# Patient Record
Sex: MALE | Race: WHITE | NOT HISPANIC OR LATINO | Employment: FULL TIME | URBAN - NONMETROPOLITAN AREA
[De-identification: names, ages, dates, MRNs, and addresses within clinical notes are randomized per-mention and may not be internally consistent; named-entity substitution may affect disease eponyms.]

---

## 2019-06-06 ENCOUNTER — OFFICE VISIT (OUTPATIENT)
Dept: FAMILY MEDICINE CLINIC | Facility: CLINIC | Age: 24
End: 2019-06-06

## 2019-06-06 ENCOUNTER — LAB (OUTPATIENT)
Dept: LAB | Facility: OTHER | Age: 24
End: 2019-06-06

## 2019-06-06 VITALS
BODY MASS INDEX: 22.05 KG/M2 | OXYGEN SATURATION: 93 % | WEIGHT: 137.2 LBS | RESPIRATION RATE: 16 BRPM | HEIGHT: 66 IN | DIASTOLIC BLOOD PRESSURE: 68 MMHG | TEMPERATURE: 98.7 F | SYSTOLIC BLOOD PRESSURE: 116 MMHG | HEART RATE: 83 BPM

## 2019-06-06 DIAGNOSIS — F11.11 HISTORY OF OPIOID ABUSE (HCC): ICD-10-CM

## 2019-06-06 DIAGNOSIS — G89.29 CHRONIC MIDLINE THORACIC BACK PAIN: ICD-10-CM

## 2019-06-06 DIAGNOSIS — M54.50 CHRONIC MIDLINE LOW BACK PAIN WITHOUT SCIATICA: Primary | ICD-10-CM

## 2019-06-06 DIAGNOSIS — R73.9 HYPERGLYCEMIA: Primary | ICD-10-CM

## 2019-06-06 DIAGNOSIS — Z00.00 ENCOUNTER FOR MEDICAL EXAMINATION TO ESTABLISH CARE: ICD-10-CM

## 2019-06-06 DIAGNOSIS — M54.6 CHRONIC MIDLINE THORACIC BACK PAIN: ICD-10-CM

## 2019-06-06 DIAGNOSIS — F19.11 HISTORY OF SUBSTANCE ABUSE (HCC): ICD-10-CM

## 2019-06-06 DIAGNOSIS — G89.29 CHRONIC MIDLINE LOW BACK PAIN WITHOUT SCIATICA: Primary | ICD-10-CM

## 2019-06-06 DIAGNOSIS — R73.9 HYPERGLYCEMIA: ICD-10-CM

## 2019-06-06 PROBLEM — M41.80 DEXTROSCOLIOSIS: Status: ACTIVE | Noted: 2019-06-06

## 2019-06-06 LAB
ALBUMIN SERPL-MCNC: 4.7 G/DL (ref 3.5–5)
ALBUMIN/GLOB SERPL: 1.5 G/DL (ref 1.1–1.8)
ALP SERPL-CCNC: 106 U/L (ref 38–126)
ALT SERPL W P-5'-P-CCNC: 22 U/L
ANION GAP SERPL CALCULATED.3IONS-SCNC: 8 MMOL/L (ref 5–15)
AST SERPL-CCNC: 33 U/L (ref 17–59)
BASOPHILS # BLD AUTO: 0.04 10*3/MM3 (ref 0–0.2)
BASOPHILS NFR BLD AUTO: 0.8 % (ref 0–1.5)
BILIRUB SERPL-MCNC: 0.3 MG/DL (ref 0.2–1.3)
BUN BLD-MCNC: 13 MG/DL (ref 7–23)
BUN/CREAT SERPL: 17.6 (ref 7–25)
CALCIUM SPEC-SCNC: 10.3 MG/DL (ref 8.4–10.2)
CHLORIDE SERPL-SCNC: 103 MMOL/L (ref 101–112)
CO2 SERPL-SCNC: 29 MMOL/L (ref 22–30)
CREAT BLD-MCNC: 0.74 MG/DL (ref 0.7–1.3)
DEPRECATED RDW RBC AUTO: 43 FL (ref 37–54)
EOSINOPHIL # BLD AUTO: 0.26 10*3/MM3 (ref 0–0.4)
EOSINOPHIL NFR BLD AUTO: 5 % (ref 0.3–6.2)
ERYTHROCYTE [DISTWIDTH] IN BLOOD BY AUTOMATED COUNT: 13.1 % (ref 12.3–15.4)
GFR SERPL CREATININE-BSD FRML MDRD: 128 ML/MIN/1.73 (ref 77–179)
GLOBULIN UR ELPH-MCNC: 3.2 GM/DL (ref 2.3–3.5)
GLUCOSE BLD-MCNC: 101 MG/DL (ref 70–99)
HCT VFR BLD AUTO: 42.5 % (ref 37.5–51)
HGB BLD-MCNC: 14.8 G/DL (ref 13–17.7)
LYMPHOCYTES # BLD AUTO: 1.37 10*3/MM3 (ref 0.7–3.1)
LYMPHOCYTES NFR BLD AUTO: 26.2 % (ref 19.6–45.3)
MCH RBC QN AUTO: 31.8 PG (ref 26.6–33)
MCHC RBC AUTO-ENTMCNC: 34.8 G/DL (ref 31.5–35.7)
MCV RBC AUTO: 91.4 FL (ref 79–97)
MONOCYTES # BLD AUTO: 0.44 10*3/MM3 (ref 0.1–0.9)
MONOCYTES NFR BLD AUTO: 8.4 % (ref 5–12)
NEUTROPHILS # BLD AUTO: 3.12 10*3/MM3 (ref 1.7–7)
NEUTROPHILS NFR BLD AUTO: 59.6 % (ref 42.7–76)
PLATELET # BLD AUTO: 293 10*3/MM3 (ref 140–450)
PMV BLD AUTO: 8.9 FL (ref 6–12)
POTASSIUM BLD-SCNC: 4.5 MMOL/L (ref 3.4–5)
PROT SERPL-MCNC: 7.9 G/DL (ref 6.3–8.6)
RBC # BLD AUTO: 4.65 10*6/MM3 (ref 4.14–5.8)
SODIUM BLD-SCNC: 140 MMOL/L (ref 137–145)
WBC NRBC COR # BLD: 5.23 10*3/MM3 (ref 3.4–10.8)

## 2019-06-06 PROCEDURE — 96372 THER/PROPH/DIAG INJ SC/IM: CPT | Performed by: PHYSICIAN ASSISTANT

## 2019-06-06 PROCEDURE — 99214 OFFICE O/P EST MOD 30 MIN: CPT | Performed by: PHYSICIAN ASSISTANT

## 2019-06-06 PROCEDURE — 83036 HEMOGLOBIN GLYCOSYLATED A1C: CPT | Performed by: PHYSICIAN ASSISTANT

## 2019-06-06 PROCEDURE — 85025 COMPLETE CBC W/AUTO DIFF WBC: CPT | Performed by: PHYSICIAN ASSISTANT

## 2019-06-06 PROCEDURE — 36415 COLL VENOUS BLD VENIPUNCTURE: CPT | Performed by: PHYSICIAN ASSISTANT

## 2019-06-06 PROCEDURE — 80053 COMPREHEN METABOLIC PANEL: CPT | Performed by: PHYSICIAN ASSISTANT

## 2019-06-06 RX ORDER — KETOROLAC TROMETHAMINE 30 MG/ML
60 INJECTION, SOLUTION INTRAMUSCULAR; INTRAVENOUS ONCE
Status: COMPLETED | OUTPATIENT
Start: 2019-06-06 | End: 2019-06-06

## 2019-06-06 RX ORDER — METHYLPREDNISOLONE ACETATE 80 MG/ML
80 INJECTION, SUSPENSION INTRA-ARTICULAR; INTRALESIONAL; INTRAMUSCULAR; SOFT TISSUE ONCE
Status: COMPLETED | OUTPATIENT
Start: 2019-06-06 | End: 2019-06-06

## 2019-06-06 RX ORDER — NAPROXEN 500 MG/1
500 TABLET ORAL 2 TIMES DAILY WITH MEALS
Qty: 90 TABLET | Refills: 0 | Status: SHIPPED | OUTPATIENT
Start: 2019-06-06 | End: 2021-07-06

## 2019-06-06 RX ADMIN — METHYLPREDNISOLONE ACETATE 80 MG: 80 INJECTION, SUSPENSION INTRA-ARTICULAR; INTRALESIONAL; INTRAMUSCULAR; SOFT TISSUE at 12:12

## 2019-06-06 RX ADMIN — KETOROLAC TROMETHAMINE 60 MG: 30 INJECTION, SOLUTION INTRAMUSCULAR; INTRAVENOUS at 12:11

## 2019-06-06 NOTE — PROGRESS NOTES
Subjective   Dimitri Steinberg is a 24 y.o. male.   Pt is new to me. The patient is here for a get acquainted visit. I have reviewed the patient's medical history in detail and updated the computerized patient record.    Back Pain   This is a chronic problem. The current episode started more than 1 year ago. The problem occurs constantly. The problem has been waxing and waning since onset. The pain is present in the lumbar spine and thoracic spine. The quality of the pain is described as aching and cramping. The pain does not radiate. The pain is at a severity of 3/10. The pain is mild. The pain is worse during the day. The symptoms are aggravated by bending, twisting, stress and position. Stiffness is present all day. Pertinent negatives include no abdominal pain, bladder incontinence, bowel incontinence, chest pain, dysuria, fever, headaches, leg pain, numbness, paresis, paresthesias, pelvic pain, perianal numbness, tingling, weakness or weight loss. Risk factors include poor posture. He has tried NSAIDs, bed rest and home exercises for the symptoms. The treatment provided mild relief.      Pt presents today to establish care with a pcp, he is accompanied by his significant other in office. Pt states he has not had a pcp in the past, states he has mostly used ER and urgent care facilities prn.      Hx of substance abuse - opioid addiction x 2 years - managed by [facility]. Last use x 2 years ago. Tapered off of suboxone x this week, per pt.     Admits to c/c of thoracic & lumbar back pain x several years, about 5 years. Admits to worsening of back pain x 1 year. Admits to trying bengay, icy-hot, ibuprofen at home with minimal improvement. Describes pain as dull, achy, cramping, daily and persistent. Admits to stiffness of thoracic & lumbar spine that is present throughout the whole day. Pain rated 3/10. Denies radiation of pain.     Admits to smokeless tobacco dependence - 1 can per day x 8 years.    Past Medical  History:   Diagnosis Date   • ADHD (attention deficit hyperactivity disorder)    • Allergic    • Headache    • Low back pain      Social History     Socioeconomic History   • Marital status: Single     Spouse name: Not on file   • Number of children: Not on file   • Years of education: Not on file   • Highest education level: Not on file   Tobacco Use   • Smoking status: Former Smoker   • Smokeless tobacco: Current User     Types: Snuff   Substance and Sexual Activity   • Alcohol use: No     Frequency: Never   • Drug use: No     No past surgical history on file.  Family History   Problem Relation Age of Onset   • Cancer Father        The following portions of the patient's history were reviewed and updated as appropriate: allergies, current medications, past family history, past medical history, past social history, past surgical history and problem list.    Review of Systems   Constitutional: Negative for activity change, appetite change, chills, diaphoresis, fatigue, fever, unexpected weight gain and unexpected weight loss.   HENT: Negative.    Eyes: Negative for blurred vision, double vision, pain, itching and visual disturbance.   Respiratory: Negative for apnea, cough, choking, chest tightness, shortness of breath, wheezing and stridor.    Cardiovascular: Negative for chest pain, palpitations and leg swelling.   Gastrointestinal: Negative for abdominal distention, abdominal pain, anal bleeding, blood in stool, bowel incontinence, constipation, diarrhea, nausea, rectal pain, vomiting, GERD and indigestion.   Endocrine: Negative.    Genitourinary: Negative for urinary incontinence, dysuria and pelvic pain.   Musculoskeletal: Positive for back pain (midline, thoracic & lumbar). Negative for arthralgias, gait problem, joint swelling, myalgias, neck pain, neck stiffness and bursitis.   Skin: Negative.    Neurological: Negative for dizziness, tingling, weakness, light-headedness, numbness, headache, paresthesias and  confusion.   Psychiatric/Behavioral: Negative.        Objective   Physical Exam   Constitutional: He is oriented to person, place, and time. Vital signs are normal. He appears well-developed and well-nourished. He is active and cooperative.  Non-toxic appearance. He does not have a sickly appearance. He does not appear ill. No distress. He is not overweight.  HENT:   Head: Normocephalic and atraumatic.   Right Ear: Hearing, tympanic membrane, external ear and ear canal normal. No drainage, swelling or tenderness. Tympanic membrane is not bulging. No middle ear effusion. No decreased hearing is noted. cerumen impaction is not present.  Left Ear: Hearing, tympanic membrane, external ear and ear canal normal. No drainage, swelling or tenderness. Tympanic membrane is not bulging.  No middle ear effusion. No decreased hearing is noted. An impacted cerumen is not present.  Nose: Nose normal.   Mouth/Throat: Uvula is midline, oropharynx is clear and moist and mucous membranes are normal. Mucous membranes are not pale, not dry and not cyanotic. No oropharyngeal exudate, posterior oropharyngeal edema, posterior oropharyngeal erythema or tonsillar abscesses. Tonsils are 0 on the right. Tonsils are 0 on the left. No tonsillar exudate.   Eyes: Conjunctivae and EOM are normal. Pupils are equal, round, and reactive to light.   Neck: Normal range of motion. Neck supple. No thyromegaly present.   Cardiovascular: Normal rate, regular rhythm, normal heart sounds and intact distal pulses. Exam reveals no gallop and no friction rub.   No murmur heard.  Pulmonary/Chest: Effort normal and breath sounds normal. No stridor. No respiratory distress. He has no wheezes. He has no rales. He exhibits no tenderness.   Abdominal: Soft. Bowel sounds are normal. He exhibits no distension and no mass. There is no tenderness. There is no rebound and no guarding. No hernia.   Musculoskeletal: He exhibits no edema.        Thoracic back: He exhibits  decreased range of motion (mildly decreased to flexion and rotation), tenderness, pain and spasm. He exhibits no bony tenderness, no swelling, no edema, no deformity and no laceration.        Lumbar back: He exhibits decreased range of motion (mild decrease to flexion and rotation), tenderness, pain and spasm. He exhibits no bony tenderness, no swelling, no edema, no deformity and no laceration.   Lymphadenopathy:     He has no cervical adenopathy.   Neurological: He is alert and oriented to person, place, and time.   Skin: Skin is warm and dry. Capillary refill takes less than 2 seconds. No rash noted. He is not diaphoretic. No erythema. No pallor.   Psychiatric: He has a normal mood and affect. His behavior is normal. Judgment and thought content normal.   Nursing note and vitals reviewed.    Vitals:    06/06/19 1122   BP: 116/68   Pulse: 83   Resp: 16   Temp: 98.7 °F (37.1 °C)   SpO2: 93%     PHQ-2 Depression Screening  Little interest or pleasure in doing things? 0   Feeling down, depressed, or hopeless? 0   PHQ-2 Total Score 0         Assessment/Plan   August was seen today for establish care and back pain.    Diagnoses and all orders for this visit:    Chronic midline low back pain without sciatica  -     XR Spine Lumbar 4+ View; Future  -     methylPREDNISolone acetate (DEPO-medrol) injection 80 mg  -     ketorolac (TORADOL) injection 60 mg  -     naproxen (NAPROSYN) 500 MG tablet; Take 1 tablet by mouth 2 (Two) Times a Day With Meals.    Chronic midline thoracic back pain  -     methylPREDNISolone acetate (DEPO-medrol) injection 80 mg  -     ketorolac (TORADOL) injection 60 mg  -     naproxen (NAPROSYN) 500 MG tablet; Take 1 tablet by mouth 2 (Two) Times a Day With Meals.  -     XR Spine Thoracic 3 View; Future    Encounter for medical examination to establish care  -     CBC & Differential; Future  -     Comprehensive metabolic panel; Future    Baseline fasting labs as above ordered for evaluation &  "assessment for chronic conditions.    Chronic thoracic & lumbar back pain - xray of lumbar spine and thoracic spine as above ordered for evaluation & assessment. Will call pt with results when received & address appropriately. Depo medrol IM injection and toradol IM injection as above given to pt in office. Advised avoidance of additional NSAIDs x 6-8 hours after toradol injection. Pt verbalized understanding. Prescription for naproxen as above sent to pharmacy - advised pt not to take medication for at least 6-8 hours due to toradol injection. Advised pt to take naproxen with food to prevent gi upset. Pt verbalized understanding.    Declines referral to physical therapy - states he will consider this when his deductible is met for the year as he is worried about cost.     Declines muscle relaxants - as we discussed side effects he states he does not want any medication that will make him drowsy.     Written excuse for light duty at work x 1 week, no lifting over 10lbs given to pt in office.    I advised August of the risks of continuing to use tobacco  During this visit, I spent <3 minutes counseling the patient regarding tobacco cessation. Discussed methods of cessation including \"cold turkey\", nicotine replacement - lozenges, patches, gum; and pharmacotherapy. Pt declines interest in tobacco cessation at this time.     Hx of substance abuse - opioid addiction x 2 years - managed by [facility]. Last use x 2 years ago. Tapered off of suboxone x this week, per pt.     Patient educated to follow up in 1 month or sooner than next scheduled appointment if symptoms worsen or do not improve. Patient stated understanding and has agreed with plan of care. After visit summary was printed and given to patient.       This document has been electronically signed by Cassandra Westfall PA-C on June 6, 2019 4:50 PM,.           "

## 2019-06-07 ENCOUNTER — TELEPHONE (OUTPATIENT)
Dept: FAMILY MEDICINE CLINIC | Facility: CLINIC | Age: 24
End: 2019-06-07

## 2019-06-07 LAB — HBA1C MFR BLD: 4.6 % (ref 4.8–5.6)

## 2019-06-07 NOTE — TELEPHONE ENCOUNTER
Informed pt of results, he stated his understanding. Wants to wait until he gets his deductible met on insurance before doing to PT, he is fine with back brace, sending to [facility].

## 2019-07-08 ENCOUNTER — OFFICE VISIT (OUTPATIENT)
Dept: FAMILY MEDICINE CLINIC | Facility: CLINIC | Age: 24
End: 2019-07-08

## 2019-07-08 VITALS
WEIGHT: 134.8 LBS | BODY MASS INDEX: 21.66 KG/M2 | HEIGHT: 66 IN | HEART RATE: 102 BPM | DIASTOLIC BLOOD PRESSURE: 74 MMHG | RESPIRATION RATE: 18 BRPM | SYSTOLIC BLOOD PRESSURE: 122 MMHG | OXYGEN SATURATION: 98 %

## 2019-07-08 DIAGNOSIS — G89.29 CHRONIC BILATERAL LOW BACK PAIN WITHOUT SCIATICA: ICD-10-CM

## 2019-07-08 DIAGNOSIS — F19.11 HISTORY OF SUBSTANCE ABUSE (HCC): ICD-10-CM

## 2019-07-08 DIAGNOSIS — M54.50 CHRONIC BILATERAL LOW BACK PAIN WITHOUT SCIATICA: ICD-10-CM

## 2019-07-08 DIAGNOSIS — F17.220 TOBACCO DEPENDENCE DUE TO CHEWING TOBACCO: ICD-10-CM

## 2019-07-08 DIAGNOSIS — F11.11 HISTORY OF OPIOID ABUSE (HCC): ICD-10-CM

## 2019-07-08 DIAGNOSIS — M41.80 DEXTROSCOLIOSIS: Primary | ICD-10-CM

## 2019-07-08 PROCEDURE — 99214 OFFICE O/P EST MOD 30 MIN: CPT | Performed by: PHYSICIAN ASSISTANT

## 2019-07-08 NOTE — PROGRESS NOTES
Subjective   Dimitri Steinberg is a 24 y.o. male.     Back Pain   This is a chronic problem. The current episode started more than 1 year ago. The problem occurs 2 to 4 times per day. The problem is unchanged. The pain is present in the lumbar spine and thoracic spine. The quality of the pain is described as aching and cramping. The pain does not radiate. The pain is at a severity of 4/10. The pain is mild. Worse during: during AM. The symptoms are aggravated by bending, position, twisting and stress. Stiffness is present all day. Pertinent negatives include no abdominal pain, bladder incontinence, bowel incontinence, chest pain, dysuria, fever, headaches, leg pain, numbness, paresis, paresthesias, pelvic pain, perianal numbness, tingling, weakness or weight loss. Risk factors include poor posture. He has tried bed rest, NSAIDs and home exercises for the symptoms. The treatment provided mild relief.      Pt presents today for a 1 month f/u on lumbar back pain.     Pt admits to chronic lumbar and thoracic midline x 4-5 years. Admits to worsening of back pain x 1 year. Admits to trying bengay, icy-hot, ibuprofen at home with minimal improvement. Describes pain as dull, achy, cramping, daily and persistent. Admits to stiffness of thoracic & lumbar spine that is present throughout the whole day. Pain rated 4/10. Denies radiation of pain.     Hx of substance abuse - opioid addiction x 2 years - managed by [facility]. Last use x 2 years ago. Tapered off of suboxone x one month, per pt    Admits to smokeless tobacco dependence - 1 can per day x 8 years.  The following portions of the patient's history were reviewed and updated as appropriate: allergies, current medications, past family history, past medical history, past social history, past surgical history and problem list.    Review of Systems   Constitutional: Negative for activity change, appetite change, chills, diaphoresis, fatigue, fever, unexpected weight gain and  unexpected weight loss.   HENT: Negative.    Eyes: Negative for blurred vision, double vision, pain, itching and visual disturbance.   Respiratory: Negative for apnea, cough, choking, chest tightness, shortness of breath, wheezing and stridor.    Cardiovascular: Negative for chest pain, palpitations and leg swelling.   Gastrointestinal: Negative for abdominal distention, abdominal pain, anal bleeding, blood in stool, bowel incontinence, constipation, diarrhea, nausea, rectal pain, vomiting, GERD and indigestion.   Endocrine: Negative.    Genitourinary: Negative for urinary incontinence, dysuria and pelvic pain.   Musculoskeletal: Positive for back pain. Negative for arthralgias, gait problem, joint swelling, myalgias, neck pain, neck stiffness and bursitis.   Skin: Negative.    Neurological: Negative for dizziness, tingling, weakness, light-headedness, numbness, headache, paresthesias and confusion.   Psychiatric/Behavioral: Negative.        Objective   Physical Exam   Constitutional: He is oriented to person, place, and time. Vital signs are normal. He appears well-developed and well-nourished. He is active and cooperative.  Non-toxic appearance. He does not have a sickly appearance. He does not appear ill. No distress. He is not overweight.  HENT:   Head: Normocephalic and atraumatic.   Right Ear: Hearing, tympanic membrane, external ear and ear canal normal. No drainage, swelling or tenderness. Tympanic membrane is not bulging. No middle ear effusion. No decreased hearing is noted. cerumen impaction is not present.  Left Ear: Hearing, tympanic membrane, external ear and ear canal normal. No drainage, swelling or tenderness. Tympanic membrane is not bulging.  No middle ear effusion. No decreased hearing is noted. An impacted cerumen is not present.  Nose: Nose normal.   Mouth/Throat: Uvula is midline, oropharynx is clear and moist and mucous membranes are normal. Mucous membranes are not pale, not dry and not  cyanotic. No oropharyngeal exudate, posterior oropharyngeal edema, posterior oropharyngeal erythema or tonsillar abscesses. Tonsils are 0 on the right. Tonsils are 0 on the left. No tonsillar exudate.   Eyes: Conjunctivae and EOM are normal. Pupils are equal, round, and reactive to light.   Neck: Normal range of motion. Neck supple. No thyromegaly present.   Cardiovascular: Normal rate, regular rhythm, normal heart sounds and intact distal pulses. Exam reveals no gallop and no friction rub.   No murmur heard.  Pulmonary/Chest: Effort normal and breath sounds normal. No stridor. No respiratory distress. He has no wheezes. He has no rales. He exhibits no tenderness.   Abdominal: Soft. Bowel sounds are normal. He exhibits no distension and no mass. There is no tenderness. There is no rebound and no guarding. No hernia.   Musculoskeletal: He exhibits no edema.        Thoracic back: He exhibits decreased range of motion (mildly decreased to flexion and rotation), tenderness, pain and spasm. He exhibits no bony tenderness, no swelling, no edema, no deformity and no laceration.        Lumbar back: He exhibits decreased range of motion (mild decrease to flexion and rotation), tenderness, pain and spasm. He exhibits no bony tenderness, no swelling, no edema, no deformity and no laceration.   Lymphadenopathy:     He has no cervical adenopathy.   Neurological: He is alert and oriented to person, place, and time.   Skin: Skin is warm and dry. Capillary refill takes less than 2 seconds. No rash noted. He is not diaphoretic. No erythema. No pallor.   Psychiatric: He has a normal mood and affect. His behavior is normal. Judgment and thought content normal.   Nursing note and vitals reviewed.    Vitals:    07/08/19 0924   BP: 122/74   Pulse: 102   Resp: 18   SpO2: 98%      PHQ-2 Depression Screening  Little interest or pleasure in doing things? 0   Feeling down, depressed, or hopeless? 0   PHQ-2 Total Score 0      WBC   Date Value  Ref Range Status   06/06/2019 5.23 3.40 - 10.80 10*3/mm3 Final     RBC   Date Value Ref Range Status   06/06/2019 4.65 4.14 - 5.80 10*6/mm3 Final     Hemoglobin   Date Value Ref Range Status   06/06/2019 14.8 13.0 - 17.7 g/dL Final     Hematocrit   Date Value Ref Range Status   06/06/2019 42.5 37.5 - 51.0 % Final     MCV   Date Value Ref Range Status   06/06/2019 91.4 79.0 - 97.0 fL Final     MCH   Date Value Ref Range Status   06/06/2019 31.8 26.6 - 33.0 pg Final     MCHC   Date Value Ref Range Status   06/06/2019 34.8 31.5 - 35.7 g/dL Final     RDW   Date Value Ref Range Status   06/06/2019 13.1 12.3 - 15.4 % Final     RDW-SD   Date Value Ref Range Status   06/06/2019 43.0 37.0 - 54.0 fl Final     MPV   Date Value Ref Range Status   06/06/2019 8.9 6.0 - 12.0 fL Final     Platelets   Date Value Ref Range Status   06/06/2019 293 140 - 450 10*3/mm3 Final     Neutrophil %   Date Value Ref Range Status   06/06/2019 59.6 42.7 - 76.0 % Final     Lymphocyte %   Date Value Ref Range Status   06/06/2019 26.2 19.6 - 45.3 % Final     Monocyte %   Date Value Ref Range Status   06/06/2019 8.4 5.0 - 12.0 % Final     Eosinophil %   Date Value Ref Range Status   06/06/2019 5.0 0.3 - 6.2 % Final     Basophil %   Date Value Ref Range Status   06/06/2019 0.8 0.0 - 1.5 % Final     Neutrophils, Absolute   Date Value Ref Range Status   06/06/2019 3.12 1.70 - 7.00 10*3/mm3 Final     Lymphocytes, Absolute   Date Value Ref Range Status   06/06/2019 1.37 0.70 - 3.10 10*3/mm3 Final     Monocytes, Absolute   Date Value Ref Range Status   06/06/2019 0.44 0.10 - 0.90 10*3/mm3 Final     Eosinophils, Absolute   Date Value Ref Range Status   06/06/2019 0.26 0.00 - 0.40 10*3/mm3 Final     Basophils, Absolute   Date Value Ref Range Status   06/06/2019 0.04 0.00 - 0.20 10*3/mm3 Final      Lab Results   Component Value Date    GLUCOSE 101 (H) 06/06/2019    BUN 13 06/06/2019    CREATININE 0.74 06/06/2019    EGFRIFNONA 128 06/06/2019    BCR 17.6  "06/06/2019    K 4.5 06/06/2019    CO2 29.0 06/06/2019    CALCIUM 10.3 (H) 06/06/2019    ALBUMIN 4.70 06/06/2019    AST 33 06/06/2019    ALT 22 06/06/2019      Assessment/Plan   August was seen today for follow-up.    Diagnoses and all orders for this visit:    Dextroscoliosis  -     diclofenac (VOLTAREN) 1 % gel gel; Apply 4 g topically to the appropriate area as directed 4 (Four) Times a Day.    Chronic bilateral low back pain without sciatica    History of substance abuse    History of opioid abuse    Tobacco dependence due to chewing tobacco    Discussed baseline fasting labs performed at last visit in office with pt.    Chronic low back pain - Continue naproxen bid prn for back pain. Declines referral to physical therapy - states he will consider when deductible is met. Declines muscle relaxant. Continue wearing back brace with physical activities/exertion/lifting. Discussed gentle stretching throughout the day. Discussed RICE therapy. Discussed changing employment due to physically demanding job that strains back - pt states he is in the process for applying for a different job. Advised pt to follow light duty restrictions at work, no lifting > 10lbs while at work. Prescription for voltaren gel as above for pt to apply topically.     I advised August of the risks of continuing to use tobacco  During this visit, I spent <3 minutes counseling the patient regarding tobacco cessation. Discussed methods of cessation including \"cold turkey\", nicotine replacement - lozenges, patches, gum; and pharmacotherapy. Pt declines interest in tobacco cessation at this time. Handout on smokeless tobacco given to pt in office.      Hx of substance abuse - opioid addiction x 2 years - managed by [facility]. Last use x 2 years ago. Tapered off of suboxone x one month, per pt.       Patient educated to follow up yearly for annual physical or sooner than next scheduled appointment if needed or symptoms worsen or do not improve. " Patient stated understanding and has agreed with plan of care. After visit summary was printed and given to patient.        This document has been electronically signed by Cassandra Westfall PA-C on July 8, 2019 9:43 PM,.

## 2019-07-08 NOTE — PATIENT INSTRUCTIONS
Smokeless Tobacco Information, Adult  Tobacco use is one of the leading causes of cancer and other chronic health problems. Smokeless tobacco is tobacco that is put directly into the mouth instead of being smoked. It may also be called chewing tobacco or snuff. Smokeless tobacco is made from the leaves of tobacco plants and it comes in several forms:  · Loose, dry leaves, plugs, or twists.  · Moist pouches.  · Dissolving lozenges or strips.    Chewing, sucking, or holding the tobacco in your mouth causes your mouth to make more saliva. The saliva mixes with the tobacco to make “tobacco juice” that is swallowed or spit out.  How can smokeless tobacco affect me?  Using smokeless tobacco:  · Increases your risk of developing cancer. Smokeless tobacco contains at least 28 different types of cancer-causing chemicals (carcinogens).  · Increases your chances of developing other long-term health problems, including high blood pressure, heart disease, stroke, and dental problems.  · Can make you become addicted. Nicotine is one of the chemicals in tobacco. When you chew tobacco, you absorb nicotine from the tobacco juice. This can make you feel more alert than usual.  · Can cause problems with pregnancy. Pregnant women who use smokeless tobacco are more likely to miscarry or deliver a baby too early (premature delivery).  · Can affect the appearance and health of your mouth. Using smokeless tobacco may cause bad breath, yellow-brown teeth, mouth sores, cracking and bleeding lips, gum recession, and lesions on the soft tissues of your mouth (leukoplakia).    What are the benefits of not using smokeless tobacco?  The benefits of not using smokeless tobacco include:  · A healthy mind because:  ? You avoid addiction.  · A healthy body because:  ? You avoid dental problems.  ? You promote healthy pregnancy.  ? You avoid long-term health problems.  · A healthy wallet because:  ? You avoid costs of buying tobacco.  ? You avoid  health care costs in the future.  · A healthy family because:  ? You avoid accidental poisoning of children in your household.    What can happen if I continue to use smokeless tobacco?  If you continue to use smokeless tobacco, you will increase your risk for developing certain cancers. These include:  · Tongue.  · Lips, mouth, and gums.  · Throat (esophagus) and voice box (larynx).  · Stomach.  · Pancreas.  · Bladder.  · Colon.    Long-term use of smokeless tobacco can also lead to:  · High blood pressure, heart disease, and stroke.  · Gum disease, gum recession, and bone loss around the teeth.  · Tooth decay.    How do I quit using smokeless tobacco?  Quitting the use of smokeless tobacco can be hard, but it can be done. Follow these steps:  · Pick a date to quit. Set a date within the next two weeks. This gives you time to prepare.  · Write down the reasons why you are quitting. Keep this list in places where you will see it often, such as on your bathroom mirror or in your car or wallet.  · Identify the people, places, things, and activities that make you want to use tobacco (triggers) and avoid them.  · Get rid of any tobacco you have and remove any tobacco smells. To do this:  ? Throw away all containers of tobacco at home, at work, and in your car.  ? Throw away any other items that you use regularly when you chew tobacco.  ? Clean your car and make sure to remove all tobacco-related items.  ? Clean your home, including curtains and carpets.  · Tell your family, friends, and coworkers that you are quitting. This can make quitting easier.  · Ask your health care provider for help quitting smokeless tobacco. This may involve treatment. Find out what treatment options are covered by your health insurance.  · Keep track of how many days have passed since you quit. Remembering how long and hard you have worked to quit can help you avoid using tobacco again.    Where can I get support?  Ask your health care  provider if there is a local support group for quitting smokeless tobacco.  Where can I get more information?  You can learn more about the risks of using smokeless tobacco and the benefits of quitting from these sources:  · [employer] [place]: www.cancer.gov  · [employer]: www.cancer.org    When should I seek medical care?  Seek medical care if you have:  · White or other discolored patches in your mouth.  · Difficulty swallowing.  · A change in your voice.  · Unexplained weight loss.  · Stomach pain, nausea, or vomiting.    Summary  · Smokeless tobacco contains at least 28 different chemicals that are known to cause cancer (carcinogen).  · Nicotine is an addictive chemical in smokeless tobacco.  · When you quit using smokeless tobacco, you lower your risk of developing cancer.  This information is not intended to replace advice given to you by your health care provider. Make sure you discuss any questions you have with your health care provider.  Document Released: 05/21/2012 Document Revised: 08/03/2018 Document Reviewed: 07/29/2016  [employer] Interactive Patient Education © 2019 [employer]

## 2021-07-06 ENCOUNTER — LAB (OUTPATIENT)
Dept: LAB | Facility: OTHER | Age: 26
End: 2021-07-06

## 2021-07-06 PROCEDURE — 87635 SARS-COV-2 COVID-19 AMP PRB: CPT | Performed by: PHYSICIAN ASSISTANT
